# Patient Record
Sex: FEMALE | Race: OTHER | Employment: OTHER | URBAN - METROPOLITAN AREA
[De-identification: names, ages, dates, MRNs, and addresses within clinical notes are randomized per-mention and may not be internally consistent; named-entity substitution may affect disease eponyms.]

---

## 2021-08-16 ENCOUNTER — HOSPITAL ENCOUNTER (EMERGENCY)
Age: 86
Discharge: NURSING, REHAB OR LONG-TERM CARE | End: 2021-08-16
Attending: EMERGENCY MEDICINE | Admitting: EMERGENCY MEDICINE
Payer: MEDICARE

## 2021-08-16 ENCOUNTER — APPOINTMENT (OUTPATIENT)
Dept: CT IMAGING | Age: 86
End: 2021-08-16
Attending: EMERGENCY MEDICINE
Payer: MEDICARE

## 2021-08-16 VITALS
HEART RATE: 84 BPM | SYSTOLIC BLOOD PRESSURE: 147 MMHG | OXYGEN SATURATION: 100 % | RESPIRATION RATE: 18 BRPM | DIASTOLIC BLOOD PRESSURE: 85 MMHG | TEMPERATURE: 97.5 F

## 2021-08-16 DIAGNOSIS — S01.01XA LACERATION OF SCALP, INITIAL ENCOUNTER: ICD-10-CM

## 2021-08-16 DIAGNOSIS — S09.90XA INJURY OF HEAD, INITIAL ENCOUNTER: Primary | ICD-10-CM

## 2021-08-16 PROCEDURE — 75810000293 HC SIMP/SUPERF WND  RPR

## 2021-08-16 PROCEDURE — 74011000250 HC RX REV CODE- 250

## 2021-08-16 PROCEDURE — 70450 CT HEAD/BRAIN W/O DYE: CPT

## 2021-08-16 PROCEDURE — 99284 EMERGENCY DEPT VISIT MOD MDM: CPT

## 2021-08-16 RX ORDER — LIDOCAINE HYDROCHLORIDE AND EPINEPHRINE 20; 10 MG/ML; UG/ML
INJECTION, SOLUTION INFILTRATION; PERINEURAL
Status: COMPLETED
Start: 2021-08-16 | End: 2021-08-16

## 2021-08-16 RX ORDER — BACITRACIN 500 UNIT/G
1 PACKET (EA) TOPICAL 3 TIMES DAILY
Status: DISCONTINUED | OUTPATIENT
Start: 2021-08-16 | End: 2021-08-16 | Stop reason: HOSPADM

## 2021-08-16 RX ADMIN — LIDOCAINE HYDROCHLORIDE AND EPINEPHRINE: 20; 10 INJECTION, SOLUTION INFILTRATION; PERINEURAL at 04:35

## 2021-08-16 NOTE — ED TRIAGE NOTES
Patient arrives via [facility] for a [facility]. The patient has a laceration to the back of the head, and the bleeding is controlled. EMS reports that the patient had some ETOH this evening before the fall. The patient is a resident of [facility] at their independent living [place].  The patient is on the monitor x 2

## 2021-08-16 NOTE — ED PROVIDER NOTES
HPI     77-year-old female presents the emergency department after falling in her kitchen. She states she was getting up to go to the bathroom and fell. She states she does have a couple shots of vodka every night and she is not willing to give that up. She has been vaccinated for Covid. She denies any recent illness, preceding chest pain dizziness lightheadedness shortness of breath etc.  No recent nausea vomiting or diarrhea. She is eating and drinking fine. She is not on any anticoagulants. She denies any other injury than the laceration to the back of her head. Past Medical History:   Diagnosis Date    Hypertension        No past surgical history on file. No family history on file. Social History     Socioeconomic History    Marital status: SINGLE     Spouse name: Not on file    Number of children: Not on file    Years of education: Not on file    Highest education level: Not on file   Occupational History    Not on file   Tobacco Use    Smoking status: Not on file   Substance and Sexual Activity    Alcohol use: Not on file    Drug use: Not on file    Sexual activity: Not on file   Other Topics Concern    Not on file   Social History Narrative    Not on file     Social Determinants of Health     Financial Resource Strain:     Difficulty of Paying Living Expenses:    Food Insecurity:     Worried About Running Out of Food in the Last Year:     [address] in the Last Year:    Transportation Needs:     Lack of Transportation (Medical):      Lack of Transportation (Non-Medical):    Physical Activity:     Days of Exercise per Week:     Minutes of Exercise per Session:    Stress:     Feeling of Stress :    Social Connections:     Frequency of Communication with Friends and Family:     Frequency of Social Gatherings with Friends and Family:     Attends Islam Services:     Active Member of Clubs or Organizations:     Attends Club or Organization Meetings:     Marital Status:    Intimate Partner Violence:     Fear of Current or Ex-Partner:     Emotionally Abused:     Physically Abused:     Sexually Abused: ALLERGIES: Patient has no known allergies. Review of Systems   Constitutional: Negative for fever. HENT: Negative for congestion. Eyes: Negative for visual disturbance. Respiratory: Negative for cough and shortness of breath. Cardiovascular: Negative for chest pain. Gastrointestinal: Negative for abdominal pain, nausea and vomiting. Endocrine: Negative for polyuria. Genitourinary: Negative for dysuria. Musculoskeletal: Negative for gait problem. Neurological: Negative for headaches. Psychiatric/Behavioral: Negative for dysphoric mood. Vitals:    08/16/21 0200   BP: (!) 166/80   Pulse: 81   Resp: 18   Temp: 97.5 °F (36.4 °C)   SpO2: 99%            Physical Exam  Constitutional:       General: She is not in acute distress. Appearance: She is well-developed. HENT:      Head: Normocephalic. Comments: Laceration to her scalp no active bleeding     Mouth/Throat:      Pharynx: No oropharyngeal exudate. Eyes:      General: No scleral icterus. Right eye: No discharge. Left eye: No discharge. Pupils: Pupils are equal, round, and reactive to light. Neck:      Vascular: No JVD. Cardiovascular:      Rate and Rhythm: Normal rate and regular rhythm. Heart sounds: Normal heart sounds. No murmur heard. Pulmonary:      Effort: Pulmonary effort is normal. No respiratory distress. Breath sounds: Normal breath sounds. No stridor. No wheezing or rales. Chest:      Chest wall: No tenderness. Abdominal:      General: Bowel sounds are normal. There is no distension. Palpations: Abdomen is soft. There is no mass. Tenderness: There is no abdominal tenderness. There is no guarding or rebound. Musculoskeletal:         General: Normal range of motion.       Cervical back: Normal range of motion and neck supple. Skin:     General: Skin is warm and dry. Capillary Refill: Capillary refill takes less than 2 seconds. Findings: No rash. Neurological:      Mental Status: She is oriented to person, place, and time. Psychiatric:         Behavior: Behavior normal.         Thought Content: Thought content normal.         Judgment: Judgment normal.          [facility]       Wound Repair    Date/Time: 8/16/2021 4:15 AM  Performed by: attendingPreparation: skin prepped with ChloraPrep  Location details: scalp  Wound length:2.5 cm or less  Anesthesia: local infiltration    Anesthesia:  Local Anesthetic: lidocaine 1% with epinephrine  Foreign bodies: no foreign bodies  Irrigation solution: saline  Irrigation method: jet lavage  Debridement: none  Skin closure: staples  Number of sutures: 2  Technique: simple  Approximation: close  Dressing: antibiotic ointment  My total time at bedside, performing this procedure was 1-15 minutes. Head CT and staple wound. CT is negative.

## 2021-08-16 NOTE — DISCHARGE INSTRUCTIONS
Head CT was negative. We cleaned the wound on the back of your head and closed it with 2 staples. The staples will need to be removed in 10-12 days. Try to keep it dry for 24 hours, then you can wash, but do not soak (no swimming) Any concerns for infection- drainage, fever, increased pain, etc. See your doctor or return here.